# Patient Record
Sex: FEMALE | URBAN - METROPOLITAN AREA
[De-identification: names, ages, dates, MRNs, and addresses within clinical notes are randomized per-mention and may not be internally consistent; named-entity substitution may affect disease eponyms.]

---

## 2021-10-06 ENCOUNTER — OFFICE VISIT (OUTPATIENT)
Dept: INTERNAL MEDICINE CLINIC | Age: 41
End: 2021-10-06
Payer: COMMERCIAL

## 2021-10-06 VITALS
WEIGHT: 225.2 LBS | HEIGHT: 68 IN | DIASTOLIC BLOOD PRESSURE: 60 MMHG | RESPIRATION RATE: 14 BRPM | BODY MASS INDEX: 34.13 KG/M2 | TEMPERATURE: 97.4 F | SYSTOLIC BLOOD PRESSURE: 120 MMHG | OXYGEN SATURATION: 98 % | HEART RATE: 79 BPM

## 2021-10-06 DIAGNOSIS — E01.0 THYROMEGALY: ICD-10-CM

## 2021-10-06 DIAGNOSIS — F17.210 CIGARETTE NICOTINE DEPENDENCE WITHOUT COMPLICATION: ICD-10-CM

## 2021-10-06 DIAGNOSIS — R63.5 WEIGHT GAIN: ICD-10-CM

## 2021-10-06 DIAGNOSIS — R53.83 OTHER FATIGUE: ICD-10-CM

## 2021-10-06 DIAGNOSIS — F32.0 CURRENT MILD EPISODE OF MAJOR DEPRESSIVE DISORDER WITHOUT PRIOR EPISODE (HCC): ICD-10-CM

## 2021-10-06 PROCEDURE — 99204 OFFICE O/P NEW MOD 45 MIN: CPT | Performed by: INTERNAL MEDICINE

## 2021-10-06 SDOH — ECONOMIC STABILITY: FOOD INSECURITY: WITHIN THE PAST 12 MONTHS, THE FOOD YOU BOUGHT JUST DIDN'T LAST AND YOU DIDN'T HAVE MONEY TO GET MORE.: NEVER TRUE

## 2021-10-06 SDOH — ECONOMIC STABILITY: FOOD INSECURITY: WITHIN THE PAST 12 MONTHS, YOU WORRIED THAT YOUR FOOD WOULD RUN OUT BEFORE YOU GOT MONEY TO BUY MORE.: NEVER TRUE

## 2021-10-06 ASSESSMENT — ENCOUNTER SYMPTOMS
EYE PAIN: 0
SHORTNESS OF BREATH: 0
SINUS PAIN: 0
NAUSEA: 0
WHEEZING: 0
SINUS PRESSURE: 0
BLOOD IN STOOL: 0
EYE DISCHARGE: 0
RHINORRHEA: 0
VOMITING: 0
COUGH: 0
SWOLLEN GLANDS: 1
ABDOMINAL PAIN: 0
VISUAL CHANGE: 0
SORE THROAT: 0
CHANGE IN BOWEL HABIT: 0
CHEST TIGHTNESS: 0
TROUBLE SWALLOWING: 0

## 2021-10-06 ASSESSMENT — PATIENT HEALTH QUESTIONNAIRE - PHQ9
SUM OF ALL RESPONSES TO PHQ QUESTIONS 1-9: 2
SUM OF ALL RESPONSES TO PHQ QUESTIONS 1-9: 2
SUM OF ALL RESPONSES TO PHQ9 QUESTIONS 1 & 2: 2
SUM OF ALL RESPONSES TO PHQ QUESTIONS 1-9: 2
1. LITTLE INTEREST OR PLEASURE IN DOING THINGS: 1
2. FEELING DOWN, DEPRESSED OR HOPELESS: 1

## 2021-10-06 ASSESSMENT — SOCIAL DETERMINANTS OF HEALTH (SDOH): HOW HARD IS IT FOR YOU TO PAY FOR THE VERY BASICS LIKE FOOD, HOUSING, MEDICAL CARE, AND HEATING?: NOT HARD AT ALL

## 2021-10-06 NOTE — PROGRESS NOTES
10/6/2021    Tejal Oneil (: 1980) is a 46 y.o. female, here for evaluation of the following medical concerns:    Chief Complaint   Patient presents with    Establish Care     c/o fatigue x  1 1/2 weeks       Explained at length that overweight is not good for knee and hip joints. Usually 160 lb  Overweight puts us at increased risk for high blood pressure and diabetes risk and must keep trying to get to ideal body weight with Body Mass Index less than 25. Smoking    The [address] for Disease Control and Prevention states:    Tobacco use harms every organ of the body which leads to disease and disability. Tobacco use causes cancer, heart disease, stroke, and lung diseases (including emphysema, bronchitis, and chronic airway obstruction). strongly encouraged  to quit using tobacco.  She has cut back since that she has been not well and she is trying to quit and she thinks by [place] she will be quit. You can decrease your cigarette use by half every 1-2 weeks to quit smoking in 4-8 weeks or so. You can use off and on nicotine gum or lozenges to help quit smoking. Pap-April-N    Fatigue  This is a new problem. The current episode started 1 to 4 weeks ago. The problem occurs constantly. The problem has been gradually worsening. Associated symptoms include anorexia, fatigue and swollen glands. Pertinent negatives include no abdominal pain, arthralgias, change in bowel habit, chest pain, chills, congestion, coughing, fever, headaches, joint swelling, myalgias, nausea, neck pain, numbness, rash, sore throat, urinary symptoms, vertigo, visual change, vomiting or weakness. Nothing aggravates the symptoms. She has tried nothing for the symptoms. The treatment provided no relief. Review of Systems   Constitutional: Positive for fatigue and unexpected weight change. Negative for appetite change, chills and fever.    HENT: Negative for congestion, ear discharge, ear pain, nosebleeds, rhinorrhea, sinus pressure, sinus pain, sore throat and trouble swallowing. Eyes: Negative for pain and discharge. Respiratory: Negative for cough, chest tightness, shortness of breath and wheezing. Cardiovascular: Negative for chest pain, palpitations and leg swelling. Gastrointestinal: Positive for anorexia. Negative for abdominal pain, blood in stool, change in bowel habit, nausea and vomiting. Endocrine: Negative for polydipsia and polyphagia. Genitourinary: Negative for difficulty urinating, enuresis, flank pain and hematuria. Musculoskeletal: Negative for arthralgias, joint swelling, myalgias and neck pain. Skin: Negative for rash. Neurological: Negative for vertigo, facial asymmetry, weakness, light-headedness, numbness and headaches. Psychiatric/Behavioral: Negative for confusion. No current outpatient medications on file prior to visit. No current facility-administered medications on file prior to visit. No Known Allergies  History reviewed. No pertinent past medical history. Past Surgical History:   Procedure Laterality Date    PATELLA SURGERY  10/01/2014    plate/gold/pin      Social History     Tobacco Use    Smoking status: Heavy Tobacco Smoker     Packs/day: 1.00     Years: 20.00     Pack years: 20.00     Types: Cigarettes     Start date: 10/26/2011    Smokeless tobacco: Never Used   Substance Use Topics    Alcohol use: Yes     Comment: socially      Family History   Problem Relation Age of Onset    Cancer Mother 64        unknown    Cancer Father 64        liver        Vitals:    10/06/21 1235   BP: 120/60   Site: Left Upper Arm   Position: Sitting   Cuff Size: Large Adult   Pulse: 79   Resp: 14   Temp: 97.4 °F (36.3 °C)   TempSrc: Temporal   SpO2: 98%   Weight: 225 lb 3.2 oz (102.2 kg)   Height: 5' 8\" (1.727 m)     Estimated body mass index is 34.24 kg/m² as calculated from the following:    Height as of this encounter: 5' 8\" (1.727 m).     Weight as of this encounter: 225 lb 3.2 oz (102.2 kg). Physical Exam  Vitals and nursing note reviewed. Constitutional:       General: She is not in acute distress. Appearance: She is well-developed. HENT:      Head: Normocephalic and atraumatic. Right Ear: Tympanic membrane, ear canal and external ear normal.      Left Ear: Tympanic membrane, ear canal and external ear normal.      Nose: Nose normal.      Mouth/Throat:      Mouth: Mucous membranes are moist.      Pharynx: No oropharyngeal exudate or posterior oropharyngeal erythema. Eyes:      General: Lids are normal. No scleral icterus. Right eye: No discharge. Left eye: No discharge. Extraocular Movements: Extraocular movements intact. Conjunctiva/sclera: Conjunctivae normal.      Pupils: Pupils are equal, round, and reactive to light. Neck:      Thyroid: Thyromegaly present. No thyroid mass. Trachea: No tracheal deviation. Cardiovascular:      Rate and Rhythm: Normal rate and regular rhythm. Heart sounds: Normal heart sounds. No gallop. Pulmonary:      Effort: Pulmonary effort is normal.      Breath sounds: Normal breath sounds. No wheezing or rales. Abdominal:      General: Bowel sounds are normal.      Palpations: Abdomen is soft. There is no mass. Tenderness: There is no abdominal tenderness. Musculoskeletal:         General: No tenderness. Cervical back: Neck supple. Comments: No leg edema or calf tenderness   Lymphadenopathy:      Cervical: No cervical adenopathy. Skin:     General: Skin is warm and dry. Findings: No rash. Neurological:      General: No focal deficit present. Mental Status: She is alert and oriented to person, place, and time. Cranial Nerves: No cranial nerve deficit. Sensory: No sensory deficit.       Coordination: Coordination normal.   Psychiatric:         Attention and Perception: Attention and perception normal.         Mood and Affect: Mood is depressed. Speech: Speech normal.         Behavior: Behavior normal.         Thought Content: Thought content normal.         Cognition and Memory: Cognition and memory normal.         ASSESSMENT/PLAN:  1. Other fatigue    - CBC Auto Differential; Future  - Comprehensive Metabolic Panel; Future  - T4, Free; Future  - TSH with Reflex; Future  - Sedimentation Rate; Future  - Urinalysis Reflex to Culture; Future    2. Weight gain    - T4, Free; Future  - TSH with Reflex; Future    3. Cigarette nicotine dependence without complication  Quit smoking    4. Current mild episode of major depressive disorder without prior episode [facility])    - External Referral to Psychiatry    5. Thyromegaly    - US THYROID; Future      Return in about 1 week (around 10/13/2021) for labs. Patient Instructions   Blood work and urine test right now. Schedule ultrasound of thyroid gland. Check with the psychiatrist who is in her network then see them. Elliptical machine exercise and try to increase to 3 miles 4 days a week. Headspace--Meditation/ pray    And limit bread potato pasta rice and avoid sweet drinks. She can have eggs or lean meat and a lot of vegetables     Strongly encouraged to quit using tobacco.   You can decrease your cigarette use by half every 1-2 weeks to quit smoking in 4-8 weeks or so. You can use off and on nicotine gum or lozenges to help quit smoking. The [address] for Disease Control and Prevention states:    Tobacco use harms every organ of the body which leads to disease and disability.  Tobacco use causes cancer, heart disease, stroke, and lung diseases (including emphysema, bronchitis, and chronic airway obstruction). We have many other ways to help you quit using tobacco.     We suggest this website:  www.smokefree. gov   You might also like this cell phone text message program.  Text message charges apply on your cell phone plan.   Text the word QUIT to [employer] to get started.  This program offers free telephone counseling. Dial 1-800-QUIT-Now    Discussed use, benefit, and side effects of prescribed medications. Barriers to compliance discussed. All patient questions answered. Pt voiced understanding. IF YOU NEED A PRESCRIPTION REFILL, THEN PLEASE GIVE US THREE WORKING DAYS TO REFILL A PRESCRIPTION. Office Hours to answer questions--Tuesday thru Friday --9.00 AM to 4.00 PM    Please get all OVER DUE VACCINATIONS done at the pharmacy as soon as possible. Electronically signed by Elodia Deluca MD on 10/6/2021 at 1:17 PM     This dictation was generated by voice recognition computer software. Although all attempts are made to edit the dictation for accuracy, there may be errors in the transcription that are not intended.

## 2021-10-06 NOTE — PATIENT INSTRUCTIONS
Blood work and urine test right now. Schedule ultrasound of thyroid gland. Check with the psychiatrist who is in her network then see them. Elliptical machine exercise and try to increase to 3 miles 4 days a week. Headspace--Meditation/ pray    And limit bread potato pasta rice and avoid sweet drinks. She can have eggs or lean meat and a lot of vegetables     Strongly encouraged to quit using tobacco.   You can decrease your cigarette use by half every 1-2 weeks to quit smoking in 4-8 weeks or so. You can use off and on nicotine gum or lozenges to help quit smoking. The [address] for Disease Control and Prevention states:    Tobacco use harms every organ of the body which leads to disease and disability.  Tobacco use causes cancer, heart disease, stroke, and lung diseases (including emphysema, bronchitis, and chronic airway obstruction). We have many other ways to help you quit using tobacco.     We suggest this website:  www.smokefree. gov   You might also like this cell phone text message program.  Text message charges apply on your cell phone plan. Text the word QUIT to [employer] to get started.  This program offers free telephone counseling. Dial 1800-QUIT-Now    Discussed use, benefit, and side effects of prescribed medications. Barriers to compliance discussed. All patient questions answered. Pt voiced understanding. IF YOU NEED A PRESCRIPTION REFILL, THEN PLEASE GIVE US THREE WORKING DAYS TO REFILL A PRESCRIPTION. Office Hours to answer questions--Tuesday thru Friday --9.00 AM to 4.00 PM    Please get all OVER DUE VACCINATIONS done at the pharmacy as soon as possible.

## 2021-10-07 ENCOUNTER — HOSPITAL ENCOUNTER (OUTPATIENT)
Dept: ULTRASOUND IMAGING | Age: 41
Discharge: HOME | End: 2021-10-07
Payer: COMMERCIAL

## 2021-10-07 DIAGNOSIS — N30.00 ACUTE CYSTITIS WITHOUT HEMATURIA: Primary | ICD-10-CM

## 2021-10-07 DIAGNOSIS — E01.0 THYROMEGALY: ICD-10-CM

## 2021-10-07 PROCEDURE — 76536 US EXAM OF HEAD AND NECK: CPT

## 2021-10-07 RX ORDER — CIPROFLOXACIN 500 MG/1
500 TABLET, FILM COATED ORAL 2 TIMES DAILY
Qty: 14 TABLET | Refills: 0 | Status: SHIPPED | OUTPATIENT
Start: 2021-10-07 | End: 2021-10-13 | Stop reason: ALTCHOICE

## 2021-10-13 ENCOUNTER — OFFICE VISIT (OUTPATIENT)
Dept: INTERNAL MEDICINE CLINIC | Age: 41
End: 2021-10-13
Payer: COMMERCIAL

## 2021-10-13 VITALS
TEMPERATURE: 97.3 F | BODY MASS INDEX: 33.52 KG/M2 | OXYGEN SATURATION: 98 % | SYSTOLIC BLOOD PRESSURE: 110 MMHG | HEART RATE: 67 BPM | HEIGHT: 68 IN | WEIGHT: 221.2 LBS | DIASTOLIC BLOOD PRESSURE: 64 MMHG

## 2021-10-13 DIAGNOSIS — E01.0 THYROMEGALY: Primary | ICD-10-CM

## 2021-10-13 DIAGNOSIS — Z12.4 SCREENING FOR CERVICAL CANCER: ICD-10-CM

## 2021-10-13 DIAGNOSIS — E66.09 CLASS 1 OBESITY DUE TO EXCESS CALORIES WITHOUT SERIOUS COMORBIDITY WITH BODY MASS INDEX (BMI) OF 33.0 TO 33.9 IN ADULT: ICD-10-CM

## 2021-10-13 DIAGNOSIS — F41.1 GENERALIZED ANXIETY DISORDER: ICD-10-CM

## 2021-10-13 PROBLEM — F32.0 CURRENT MILD EPISODE OF MAJOR DEPRESSIVE DISORDER WITHOUT PRIOR EPISODE (HCC): Status: RESOLVED | Noted: 2021-10-06 | Resolved: 2021-10-13

## 2021-10-13 PROBLEM — E66.811 CLASS 1 OBESITY DUE TO EXCESS CALORIES WITHOUT SERIOUS COMORBIDITY WITH BODY MASS INDEX (BMI) OF 33.0 TO 33.9 IN ADULT: Status: ACTIVE | Noted: 2021-10-13

## 2021-10-13 PROBLEM — F17.210 CIGARETTE NICOTINE DEPENDENCE WITHOUT COMPLICATION: Status: RESOLVED | Noted: 2021-10-06 | Resolved: 2021-10-13

## 2021-10-13 PROCEDURE — 99213 OFFICE O/P EST LOW 20 MIN: CPT | Performed by: INTERNAL MEDICINE

## 2021-10-13 ASSESSMENT — ENCOUNTER SYMPTOMS
WHEEZING: 0
SHORTNESS OF BREATH: 0
TROUBLE SWALLOWING: 0
SINUS PAIN: 0
VOMITING: 0
COUGH: 0
SORE THROAT: 0
EYE PAIN: 0
RHINORRHEA: 0
NAUSEA: 0
BLOOD IN STOOL: 0
EYE DISCHARGE: 0
ABDOMINAL PAIN: 0
SINUS PRESSURE: 0
CHEST TIGHTNESS: 0

## 2021-10-13 NOTE — PROGRESS NOTES
10/13/2021     Hussein Lund (: 1980) is a 46 y.o. female, here for evaluation of the following medical concerns:    Chief Complaint   Patient presents with    Discuss Labs     1 week follow-up        She is doing better with the anxiety depression also and only feel down only some days and she gets more energy with exercise unexpectedly and she has been exercising on elliptical and that is making her feel better and she has been talking to her 's wife charted sugar and she has been feeling better that way too    She has been doing meditation also and that is helping too. And orders Only on 10/06/2021--she did good with the Cipro antibiotic and feeling better with the urine change now.     She drinks a lot of juices and explained to her that she does need to change to water drinking color,   UA                                     Date: 10/06/2021  Value: Yellow      Ref range: Straw/Yellow       Status: Final  Clarity, UA                                   Date: 10/06/2021  Value: CLOUDY*     Ref range: Clear              Status: Final  Glucose, Ur                                   Date: 10/06/2021  Value: Negative    Ref range: Negative mg/dL     Status: Final  Bilirubin Urine                               Date: 10/06/2021  Value: Negative    Ref range: Negative           Status: Final  Ketones, Urine                                Date: 10/06/2021  Value: TRACE*      Ref range: Negative mg/dL     Status: Final  Specific [place], UA                          Date: 10/06/2021  Value: >=1.030     Ref range: 1.005 - 1.030      Status: Final  Blood, Urine                                  Date: 10/06/2021  Value: Negative    Ref range: Negative           Status: Final  pH, UA                                        Date: 10/06/2021  Value: 6.0         Ref range: 5.0 - 8.0          Status: Final  Protein, UA                                   Date: 10/06/2021  Value: TRACE*      Ref range: Negative mg/dL     Status: Final  Urobilinogen, Urine                           Date: 10/06/2021  Value: 0.2         Ref range: <2.0 E.U./dL       Status: Final  Nitrite, Urine                                Date: 10/06/2021  Value: Negative    Ref range: Negative           Status: Final  Leukocyte Esterase, Urine                     Date: 10/06/2021  Value: TRACE*      Ref range: Negative           Status: Final  Microscopic Examination                       Date: 10/06/2021  Value: YES           Status: Final  Urine Type                                    Date: 10/06/2021  Value: Cleancatch    Status: Final  Urine Reflex to Culture                       Date: 10/06/2021  Value: Yes           Status: Final  Sed Rate                                      Date: 10/06/2021  Value: 10          Ref range: 0 - 20 mm/Hr       Status: Final  TSH                                           Date: 10/06/2021  Value: 1.69        Ref range: 0.27 - 4.20 uIU/*  Status: Final  T4 Free                                       Date: 10/06/2021  Value: 1.2         Ref range: 0.9 - 1.8 ng/dL    Status: Final  Sodium                                        Date: 10/06/2021  Value: 138         Ref range: 136 - 145 mmol/L   Status: Final  Potassium                                     Date: 10/06/2021  Value: 4.3         Ref range: 3.5 - 5.1 mmol/L   Status: Final  Chloride                                      Date: 10/06/2021  Value: 104         Ref range: 99 - 110 mmol/L    Status: Final  CO2                                           Date: 10/06/2021  Value: 22          Ref range: 21 - 32 mmol/L     Status: Final  Anion Gap                                     Date: 10/06/2021  Value: 12          Ref range: 3 - 16             Status: Final  Glucose                                       Date: 10/06/2021  Value: 91          Ref range: 70 - 99 mg/dL      Status: Final  BUN                                           Date: 10/06/2021  Value: 7 Ref range: 7 - 20 mg/dL       Status: Final  CREATININE                                    Date: 10/06/2021  Value: 1.0         Ref range: 0.6 - 1.1 mg/dL    Status: Final  GFR Non-                      Date: 10/06/2021  Value: >60         Ref range: >60                Status: Final                Comment: >60 mL/min/1.73m2 EGFR, calc. for ages 25 and older using the  MDRD formula (not corrected for weight), is valid for stable  renal function. GFR                           Date: 10/06/2021  Value: >60         Ref range: >60                Status: Final                Comment: Chronic Kidney Disease: less than 60 ml/min/1.73 sq.m. Kidney Failure: less than 15 ml/min/1.73 sq.m. Results valid for patients 18 years and older.     Calcium                                       Date: 10/06/2021  Value: 9.3         Ref range: 8.3 - 10.6 mg/dL   Status: Final  Total Protein                                 Date: 10/06/2021  Value: 6.8         Ref range: 6.4 - 8.2 g/dL     Status: Final  Albumin                                       Date: 10/06/2021  Value: 4.2         Ref range: 3.4 - 5.0 g/dL     Status: Final  Albumin/Globulin Ratio                        Date: 10/06/2021  Value: 1.6         Ref range: 1.1 - 2.2          Status: Final  Total Bilirubin                               Date: 10/06/2021  Value: 0.3         Ref range: 0.0 - 1.0 mg/dL    Status: Final  Alkaline Phosphatase                          Date: 10/06/2021  Value: 88          Ref range: 40 - 129 U/L       Status: Final  ALT                                           Date: 10/06/2021  Value: 6*          Ref range: 10 - 40 U/L        Status: Final  AST                                           Date: 10/06/2021  Value: 14*         Ref range: 15 - 37 U/L        Status: Final  Globulin                                      Date: 10/06/2021  Value: 2.6         Ref range: g/dL               Status: Final  WBC Date: 10/06/2021  Value: 8.5         Ref range: 4.0 - 11.0 K/uL    Status: Final  RBC                                           Date: 10/06/2021  Value: 4.38        Ref range: 4.00 - 5.20 M/uL   Status: Final  Hemoglobin                                    Date: 10/06/2021  Value: 13.2        Ref range: 12.0 - 16.0 g/dL   Status: Final  Hematocrit                                    Date: 10/06/2021  Value: 41.1        Ref range: 36.0 - 48.0 %      Status: Final  MCV                                           Date: 10/06/2021  Value: 93.7        Ref range: 80.0 - 100.0 fL    Status: Final  MCH                                           Date: 10/06/2021  Value: 30.0        Ref range: 26.0 - 34.0 pg     Status: Final  MCHC                                          Date: 10/06/2021  Value: 32.1        Ref range: 31.0 - 36.0 g/dL   Status: Final  RDW                                           Date: 10/06/2021  Value: 15.1        Ref range: 12.4 - 15.4 %      Status: Final  Platelets                                     Date: 10/06/2021  Value: 272         Ref range: 135 - 450 K/uL     Status: Final  MPV                                           Date: 10/06/2021  Value: 8.3         Ref range: 5.0 - 10.5 fL      Status: Final  Neutrophils %                                 Date: 10/06/2021  Value: 57.8        Ref range: %                  Status: Final  Lymphocytes %                                 Date: 10/06/2021  Value: 32.1        Ref range: %                  Status: Final  Monocytes %                                   Date: 10/06/2021  Value: 6.9         Ref range: %                  Status: Final  Eosinophils %                                 Date: 10/06/2021  Value: 2.5         Ref range: %                  Status: Final  Basophils %                                   Date: 10/06/2021  Value: 0.7         Ref range: %                  Status: Final  Neutrophils Absolute Date: 10/06/2021  Value: 4.9         Ref range: 1.7 - 7.7 K/uL     Status: Final  Lymphocytes Absolute                          Date: 10/06/2021  Value: 2.7         Ref range: 1.0 - 5.1 K/uL     Status: Final  Monocytes Absolute                            Date: 10/06/2021  Value: 0.6         Ref range: 0.0 - 1.3 K/uL     Status: Final  Eosinophils Absolute                          Date: 10/06/2021  Value: 0.2         Ref range: 0.0 - 0.6 K/uL     Status: Final  Basophils Absolute                            Date: 10/06/2021  Value: 0.1         Ref range: 0.0 - 0.2 K/uL     Status: Final  Mucus, UA                                     Date: 10/07/2021  Value: 2+*         Ref range: None Seen /LPF     Status: Final  RBC, UA                                       Date: 10/07/2021  Value: 0-2         Ref range: 0 - 4 /HPF         Status: Final  Bacteria, UA                                  Date: 10/07/2021  Value: 2+*         Ref range: None Seen /HPF     Status: Final  Crystals, UA                                  Date: 10/07/2021  Value: Few Ca. Oxalate                     Ref range: None Seen /HPF     Status: Final  WBC, UA                                       Date: 10/07/2021  Value: 13*         Ref range: 0 - 5 /HPF         Status: Final  Epithelial Cells, UA                          Date: 10/07/2021  Value: >36*        Ref range: 0 - 5 /HPF         Status: Final                Comment: Urinalysis microscopic performed using the  automated methodology (AUWI analyzer). Urine Culture, Routine                        Date: 10/06/2021  Value: <10,000 CFU/ml mixed skin/urogenital ashtyn. No fu*                       Status: Final  ------------  Bilateral thyroid nodules, as noted above, none of which requiring sonographic follow-up.         Smoking    The [address] for Disease Control and Prevention states: She is almost quit smoking and she has not smoked for 2 days now.   She thinks smoking was irritating the throat as she quit smoking her throat is better and ultrasound of thyroid was normal too  Tobacco use harms every organ of the body which leads to disease and disability. Tobacco use causes cancer, heart disease, stroke, and lung diseases (including emphysema, bronchitis, and chronic airway obstruction). strongly encouraged  to quit using tobacco.   You can decrease your cigarette use by half every 1-2 weeks to quit smoking in 4-8 weeks or so. You can use off and on nicotine gum or lozenges to help quit smoking. Review of Systems   Constitutional: Negative for appetite change, chills, fever and unexpected weight change. HENT: Negative for congestion, ear discharge, ear pain, nosebleeds, rhinorrhea, sinus pressure, sinus pain, sore throat and trouble swallowing. Eyes: Negative for pain and discharge. Respiratory: Negative for cough, chest tightness, shortness of breath and wheezing. Cardiovascular: Negative for chest pain, palpitations and leg swelling. Gastrointestinal: Negative for abdominal pain, blood in stool, nausea and vomiting. Endocrine: Negative for polydipsia and polyphagia. Genitourinary: Negative for difficulty urinating, enuresis, flank pain and hematuria. Musculoskeletal: Negative for myalgias. Skin: Negative for rash. Neurological: Negative for facial asymmetry, weakness, light-headedness, numbness and headaches. Psychiatric/Behavioral: Negative for confusion. No current outpatient medications on file prior to visit. No current facility-administered medications on file prior to visit.       Past Medical History:   Diagnosis Date    Cigarette nicotine dependence without complication 95/7/3459    [facility] major [facility] 10/6/2021      Social History     Tobacco Use    Smoking status: Former Smoker     Packs/day: 1.00     Years: 20.00     Pack years: 20.00     Types: Cigarettes     Start date: 10/26/2011   Aracelis Parks Smokeless tobacco: Never Used   Substance Use Topics    Alcohol use: Yes     Comment: socially      Family History   Problem Relation Age of Onset    Cancer Mother 64        unknown    Cancer Father 64        liver        Vitals:    10/13/21 1329   BP: 110/64   Site: Left Upper Arm   Position: Sitting   Cuff Size: Large Adult   Pulse: 67   Temp: 97.3 °F (36.3 °C)   TempSrc: Temporal   SpO2: 98%   Weight: 221 lb 3.2 oz (100.3 kg)   Height: 5' 8\" (1.727 m)     Estimated body mass index is 33.63 kg/m² as calculated from the following:    Height as of this encounter: 5' 8\" (1.727 m). Weight as of this encounter: 221 lb 3.2 oz (100.3 kg). Physical Exam  Vitals and nursing note reviewed. Constitutional:       General: She is not in acute distress. Appearance: She is well-developed. HENT:      Head: Normocephalic and atraumatic. Right Ear: External ear normal.      Left Ear: External ear normal.   Eyes:      General: Lids are normal. No scleral icterus. Right eye: No discharge. Left eye: No discharge. Conjunctiva/sclera: Conjunctivae normal.      Pupils: Pupils are equal, round, and reactive to light. Neck:      Thyroid: No thyroid mass or thyromegaly. Trachea: No tracheal deviation. Cardiovascular:      Rate and Rhythm: Normal rate and regular rhythm. Heart sounds: Normal heart sounds. No gallop. Pulmonary:      Effort: Pulmonary effort is normal.      Breath sounds: Normal breath sounds. No wheezing or rales. Abdominal:      General: Bowel sounds are normal.      Palpations: Abdomen is soft. There is no mass. Tenderness: There is no abdominal tenderness. Musculoskeletal:         General: No tenderness. Cervical back: Neck supple. Comments: No leg edema or calf tenderness   Lymphadenopathy:      Cervical: No cervical adenopathy. Skin:     General: Skin is warm and dry. Findings: No rash.    Neurological:      General: No focal deficit present. Mental Status: She is alert and oriented to person, place, and time. Cranial Nerves: No cranial nerve deficit. Sensory: No sensory deficit. Coordination: Coordination normal.   Psychiatric:         Mood and Affect: Mood normal.         Speech: Speech normal.         Behavior: Behavior normal.         Thought Content: Thought content normal.         Judgment: Judgment normal.         ASSESSMENT/PLAN:  1. Thyromegaly  Yearly ultrasound on the thyroid    2. Generalized anxiety disorder  Reg ex/meditation    3. Class 1 obesity due to excess calories without serious comorbidity with body mass index (BMI) of 33.0 to 33.9 in adult  Lose wt    4. Screening for cervical cancer    - Lisandro Waters MD, Gynecology, [facility]      Return in about 1 year (around 10/13/2022) for annual physical.     Patient Instructions   64 ounce water from 8 AM to 6 PM     extensive counseling done regarding DIET AND EXERCISE to lose weight to avoid morbidities associated with overweight. Try to do SCHEDULED 3 - 4 miles brisk walk or elliptical machine use at least 4 days a week and  Dumbbell 2-5 lb arm exercises--4 sets of 4 group arm muscles -- 4 days a week. Breakfast : 4 egg white omelet or scrambled eggs with bell pepper,onion,tomato,spinach etc or boiled eggs for breakfast.     Lunch : Deck of card size meat (baked, broiled or grilled ) with leafy vegetables - spinach / kale / mustard green / lettuce etc. for salad. Supper : Viridiana Belt grilled meats -deck of cards sized with 3/4th dinner plate full of vegetables -green bean or broccoli or cauliflower or carrots. If needed , buy bread 35 to 40 kcal- two slices at a time only and Tortillas 50- 90 kcal only at one meal.    Least or no bread, potato, pasta, highly processed foods, fried foods, sweets etc.    Meditation    Bible  Discussed use, benefit, and side effects of prescribed medications. Barriers to compliance discussed. All patient questions answered. Pt voiced understanding. IF YOU NEED A PRESCRIPTION REFILL, THEN PLEASE GIVE US THREE WORKING DAYS TO REFILL A PRESCRIPTION. Office Hours to answer questions--Tuesday thru Friday --9.00 AM to 4.00 PM    Please get all OVER DUE VACCINATIONS done at the pharmacy as soon as possible. Electronically signed by Daron Quezada MD on 10/13/2021 at 2:03 PM     This dictation was generated by voice recognition computer software. Although all attempts are made to edit the dictation for accuracy, there may be errors in the transcription that are not intended.

## 2021-10-13 NOTE — PATIENT INSTRUCTIONS
64 ounce water from 8 AM to 6 PM     extensive counseling done regarding DIET AND EXERCISE to lose weight to avoid morbidities associated with overweight. Try to do SCHEDULED 3 - 4 miles brisk walk or elliptical machine use at least 4 days a week and  Dumbbell 2-5 lb arm exercises--4 sets of 4 group arm muscles -- 4 days a week. Breakfast : 4 egg white omelet or scrambled eggs with bell pepper,onion,tomato,spinach etc or boiled eggs for breakfast.     Lunch : Deck of card size meat (baked, broiled or grilled ) with leafy vegetables - spinach / kale / mustard green / lettuce etc. for salad. Supper : Connor Dave grilled meats -deck of cards sized with 3/4th dinner plate full of vegetables -green bean or broccoli or cauliflower or carrots. If needed , buy bread 35 to 40 kcal- two slices at a time only and Tortillas 50- 90 kcal only at one meal.    Least or no bread, potato, pasta, highly processed foods, fried foods, sweets etc.    Meditation    Bible  Discussed use, benefit, and side effects of prescribed medications. Barriers to compliance discussed. All patient questions answered. Pt voiced understanding. IF YOU NEED A PRESCRIPTION REFILL, THEN PLEASE GIVE US THREE WORKING DAYS TO REFILL A PRESCRIPTION. Office Hours to answer questions--Tuesday thru Friday --9.00 AM to 4.00 PM    Please get all OVER DUE VACCINATIONS done at the pharmacy as soon as possible.

## 2025-06-02 ENCOUNTER — HOSPITAL ENCOUNTER (EMERGENCY)
Age: 45
Discharge: HOME | End: 2025-06-02

## 2025-06-02 ENCOUNTER — APPOINTMENT (OUTPATIENT)
Dept: GENERAL RADIOLOGY | Age: 45
End: 2025-06-02

## 2025-06-02 ENCOUNTER — APPOINTMENT (OUTPATIENT)
Dept: CT IMAGING | Age: 45
End: 2025-06-02

## 2025-06-02 VITALS
TEMPERATURE: 98.2 F | OXYGEN SATURATION: 99 % | SYSTOLIC BLOOD PRESSURE: 140 MMHG | DIASTOLIC BLOOD PRESSURE: 105 MMHG | RESPIRATION RATE: 18 BRPM | HEART RATE: 97 BPM

## 2025-06-02 DIAGNOSIS — S09.90XA INJURY OF HEAD, INITIAL ENCOUNTER: ICD-10-CM

## 2025-06-02 DIAGNOSIS — T74.21XA SEXUAL ASSAULT OF ADULT, INITIAL ENCOUNTER: Primary | ICD-10-CM
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
## 2025-06-02 DIAGNOSIS — S20.212A RIB CONTUSION, LEFT, INITIAL ENCOUNTER: ICD-10-CM

## 2025-06-02 DIAGNOSIS — R51.9 ACUTE NONINTRACTABLE HEADACHE, UNSPECIFIED HEADACHE TYPE: ICD-10-CM

## 2025-06-02 DIAGNOSIS — Z20.2 STD EXPOSURE: ICD-10-CM

## 2025-06-02 LAB
BACTERIA URNS QL MICRO: NORMAL /HPF
BILIRUB UR QL STRIP.AUTO: NEGATIVE
CLARITY UR: CLEAR
COLOR UR: YELLOW
EPI CELLS #/AREA URNS AUTO: 1 /HPF (ref 0–5)
GLUCOSE UR STRIP.AUTO-MCNC: NEGATIVE MG/DL
HCG UR QL: NEGATIVE
HGB UR QL STRIP.AUTO: NEGATIVE
HYALINE CASTS #/AREA URNS AUTO: 0 /LPF (ref 0–8)
KETONES UR STRIP.AUTO-MCNC: NEGATIVE MG/DL
LEUKOCYTE ESTERASE UR QL STRIP.AUTO: NEGATIVE
NITRITE UR QL STRIP.AUTO: NEGATIVE
PH UR STRIP.AUTO: 6.5 [PH] (ref 5–8)
PROT UR STRIP.AUTO-MCNC: NEGATIVE MG/DL
RBC CLUMPS #/AREA URNS AUTO: 0 /HPF (ref 0–4)
SP GR UR STRIP.AUTO: 1 (ref 1–1.03)
UA DIPSTICK W REFLEX MICRO PNL UR: NORMAL
URN SPEC COLLECT METH UR: NORMAL
UROBILINOGEN UR STRIP-ACNC: 0.2 E.U./DL
WBC #/AREA URNS AUTO: 1 /HPF (ref 0–5)

## 2025-06-02 PROCEDURE — 81001 URINALYSIS AUTO W/SCOPE: CPT

## 2025-06-02 PROCEDURE — 6360000002 HC RX W HCPCS: Performed by: PHYSICIAN ASSISTANT

## 2025-06-02 PROCEDURE — 6370000000 HC RX 637 (ALT 250 FOR IP): Performed by: PHYSICIAN ASSISTANT

## 2025-06-02 PROCEDURE — 84703 CHORIONIC GONADOTROPIN ASSAY: CPT

## 2025-06-02 PROCEDURE — 96372 THER/PROPH/DIAG INJ SC/IM: CPT

## 2025-06-02 PROCEDURE — 71101 X-RAY EXAM UNILAT RIBS/CHEST: CPT

## 2025-06-02 PROCEDURE — 99284 EMERGENCY DEPT VISIT MOD MDM: CPT

## 2025-06-02 PROCEDURE — 70450 CT HEAD/BRAIN W/O DYE: CPT

## 2025-06-02 RX ORDER — DOXYCYCLINE HYCLATE 100 MG
100 TABLET ORAL 2 TIMES DAILY
Qty: 14 TABLET | Refills: 0 | Status: SHIPPED | OUTPATIENT
Start: 2025-06-02 | End: 2025-06-09

## 2025-06-02 RX ORDER — CEFTRIAXONE 500 MG/1
500 INJECTION, POWDER, FOR SOLUTION INTRAMUSCULAR; INTRAVENOUS ONCE
Status: COMPLETED | OUTPATIENT
Start: 2025-06-02 | End: 2025-06-02

## 2025-06-02 RX ORDER — KETOROLAC TROMETHAMINE 30 MG/ML
30 INJECTION, SOLUTION INTRAMUSCULAR; INTRAVENOUS ONCE
Status: COMPLETED | OUTPATIENT
Start: 2025-06-02 | End: 2025-06-02

## 2025-06-02 RX ORDER — METRONIDAZOLE 500 MG/1
2000 TABLET ORAL ONCE
Status: COMPLETED | OUTPATIENT
Start: 2025-06-02 | End: 2025-06-02

## 2025-06-02 RX ORDER — ACETAMINOPHEN 500 MG
1000 TABLET ORAL ONCE
Status: COMPLETED | OUTPATIENT
Start: 2025-06-02 | End: 2025-06-02

## 2025-06-02 RX ADMIN — METRONIDAZOLE 2000 MG: 500 TABLET ORAL at 12:20

## 2025-06-02 RX ADMIN — ACETAMINOPHEN 1000 MG: 500 TABLET ORAL at 08:38

## 2025-06-02 RX ADMIN — CEFTRIAXONE SODIUM 500 MG: 500 INJECTION, POWDER, FOR SOLUTION INTRAMUSCULAR; INTRAVENOUS at 12:20

## 2025-06-02 RX ADMIN — KETOROLAC TROMETHAMINE 30 MG: 30 INJECTION, SOLUTION INTRAMUSCULAR at 12:20

## 2025-06-02 ASSESSMENT — LIFESTYLE VARIABLES
HOW OFTEN DO YOU HAVE A DRINK CONTAINING ALCOHOL: 2-4 TIMES A MONTH
HOW MANY STANDARD DRINKS CONTAINING ALCOHOL DO YOU HAVE ON A TYPICAL DAY: 1 OR 2

## 2025-06-02 ASSESSMENT — PAIN DESCRIPTION - DESCRIPTORS: DESCRIPTORS: ACHING

## 2025-06-02 ASSESSMENT — PAIN SCALES - GENERAL
PAINLEVEL_OUTOF10: 6

## 2025-06-02 ASSESSMENT — PAIN - FUNCTIONAL ASSESSMENT
PAIN_FUNCTIONAL_ASSESSMENT: 0-10
PAIN_FUNCTIONAL_ASSESSMENT: ACTIVITIES ARE NOT PREVENTED

## 2025-06-02 ASSESSMENT — PAIN DESCRIPTION - LOCATION: LOCATION: GENERALIZED
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
## 2025-06-02 NOTE — ED NOTES
Discharge and education instructions reviewed. Patient verbalized understanding, teach-back successful. Patient denied questions at this time. No acute distress noted. Patient instructed to follow-up as noted - return to emergency department if symptoms worsen. Patient verbalized understanding. Discharged per EDMD with discharge instructions.  Patient requested AVS be faxed to her employer and provided number for fax, I offered work note which she declined; fax sent.

## 2025-06-02 NOTE — ED NOTES
Multiple attempts to fax AVS per patient request; fax would not transmit. Patient aware and ambulated off unit.
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
